# Patient Record
Sex: FEMALE | Race: WHITE | NOT HISPANIC OR LATINO | Employment: FULL TIME | URBAN - METROPOLITAN AREA
[De-identification: names, ages, dates, MRNs, and addresses within clinical notes are randomized per-mention and may not be internally consistent; named-entity substitution may affect disease eponyms.]

---

## 2022-06-06 DIAGNOSIS — Z12.11 SCREENING FOR COLON CANCER: Primary | ICD-10-CM

## 2023-02-02 ENCOUNTER — TELEPHONE (OUTPATIENT)
Dept: GASTROENTEROLOGY | Facility: CLINIC | Age: 53
End: 2023-02-02

## 2023-02-02 ENCOUNTER — OFFICE VISIT (OUTPATIENT)
Dept: GASTROENTEROLOGY | Facility: CLINIC | Age: 53
End: 2023-02-02
Payer: COMMERCIAL

## 2023-02-02 VITALS
DIASTOLIC BLOOD PRESSURE: 85 MMHG | OXYGEN SATURATION: 93 % | BODY MASS INDEX: 35.65 KG/M2 | SYSTOLIC BLOOD PRESSURE: 155 MMHG | HEART RATE: 112 BPM | HEIGHT: 64 IN | WEIGHT: 208.81 LBS

## 2023-02-02 DIAGNOSIS — Z80.0 FAMILY HISTORY OF COLON CANCER: ICD-10-CM

## 2023-02-02 DIAGNOSIS — Z12.11 SCREENING FOR COLON CANCER: ICD-10-CM

## 2023-02-02 DIAGNOSIS — K21.9 GASTROESOPHAGEAL REFLUX DISEASE, UNSPECIFIED WHETHER ESOPHAGITIS PRESENT: Primary | ICD-10-CM

## 2023-02-02 DIAGNOSIS — R13.10 DYSPHAGIA, UNSPECIFIED TYPE: ICD-10-CM

## 2023-02-02 PROCEDURE — 3077F SYST BP >= 140 MM HG: CPT | Mod: CPTII,S$GLB,,

## 2023-02-02 PROCEDURE — 3077F PR MOST RECENT SYSTOLIC BLOOD PRESSURE >= 140 MM HG: ICD-10-PCS | Mod: CPTII,S$GLB,,

## 2023-02-02 PROCEDURE — 99204 PR OFFICE/OUTPT VISIT, NEW, LEVL IV, 45-59 MIN: ICD-10-PCS | Mod: S$GLB,,,

## 2023-02-02 PROCEDURE — 3079F DIAST BP 80-89 MM HG: CPT | Mod: CPTII,S$GLB,,

## 2023-02-02 PROCEDURE — 3008F PR BODY MASS INDEX (BMI) DOCUMENTED: ICD-10-PCS | Mod: CPTII,S$GLB,,

## 2023-02-02 PROCEDURE — 1160F PR REVIEW ALL MEDS BY PRESCRIBER/CLIN PHARMACIST DOCUMENTED: ICD-10-PCS | Mod: CPTII,S$GLB,,

## 2023-02-02 PROCEDURE — 3008F BODY MASS INDEX DOCD: CPT | Mod: CPTII,S$GLB,,

## 2023-02-02 PROCEDURE — 99204 OFFICE O/P NEW MOD 45 MIN: CPT | Mod: S$GLB,,,

## 2023-02-02 PROCEDURE — 1160F RVW MEDS BY RX/DR IN RCRD: CPT | Mod: CPTII,S$GLB,,

## 2023-02-02 PROCEDURE — 3079F PR MOST RECENT DIASTOLIC BLOOD PRESSURE 80-89 MM HG: ICD-10-PCS | Mod: CPTII,S$GLB,,

## 2023-02-02 PROCEDURE — 1159F MED LIST DOCD IN RCRD: CPT | Mod: CPTII,S$GLB,,

## 2023-02-02 PROCEDURE — 1159F PR MEDICATION LIST DOCUMENTED IN MEDICAL RECORD: ICD-10-PCS | Mod: CPTII,S$GLB,,

## 2023-02-02 RX ORDER — SOD SULF/POT CHLORIDE/MAG SULF 1.479 G
12 TABLET ORAL DAILY
Qty: 24 TABLET | Refills: 0 | Status: SHIPPED | OUTPATIENT
Start: 2023-02-02

## 2023-02-02 NOTE — PROGRESS NOTES
Clinic Note    Reason for visit:  The primary encounter diagnosis was Gastroesophageal reflux disease, unspecified whether esophagitis present. Diagnoses of Dysphagia, unspecified type, Family history of colon cancer, and Screening for colon cancer were also pertinent to this visit.    PCP: Jeromy Dia       HPI:  This is a 52 y.o. female who is here to establish care. Patient is scheduled for her first colonoscopy with Dr. Lowery on 2/13/2023 and is here to discuss adding EGD to be done same day as colonoscopy. Patient reports reflux that worsened recently. She used to take baking soda as needed. She is taking panto 40 daily now and states it works well but she may wake up in middle of night with heartburn. Reports intermittent dysphagia with foods but not often. Denies NSAID use. No prior EGD/Colonoscopy. Denies blood in stool or abdominal pain. Mother diagnosed with colon cancer at age 67.       Review of Systems   Constitutional:  Positive for chills and fatigue. Negative for diaphoresis, fever and unexpected weight change.   HENT:  Positive for postnasal drip. Negative for mouth sores, nosebleeds, sore throat, trouble swallowing and voice change.    Eyes:  Negative for pain, discharge and eye dryness.   Respiratory:  Negative for apnea, cough, choking, chest tightness, shortness of breath and wheezing.    Cardiovascular:  Negative for chest pain, palpitations, leg swelling and claudication.   Gastrointestinal:  Positive for reflux. Negative for abdominal distention, abdominal pain, anal bleeding, blood in stool, change in bowel habit, constipation, diarrhea, nausea, rectal pain, vomiting, fecal incontinence and change in bowel habit.   Genitourinary:  Negative for bladder incontinence, difficulty urinating, dysuria, flank pain, frequency and hematuria.   Musculoskeletal:  Positive for back pain and joint swelling. Negative for arthralgias and joint deformity.   Integumentary:  Negative for color change, rash  and wound.   Allergic/Immunologic: Negative for environmental allergies and food allergies.   Neurological:  Negative for seizures, facial asymmetry, speech difficulty, weakness, headaches and memory loss.   Hematological:  Negative for adenopathy. Does not bruise/bleed easily.   Psychiatric/Behavioral:  Negative for agitation, behavioral problems, confusion, hallucinations and sleep disturbance.       Past Medical History:   Diagnosis Date    Heartburn     Mixed hyperlipidemia     Obesity, Class II, BMI 35-39.9, isolated (see actual BMI)      Past Surgical History:   Procedure Laterality Date    EYE SURGERY      Pin in left eye    LAPAROSCOPIC TOTAL HYSTERECTOMY       Family History   Problem Relation Age of Onset    Colon cancer Mother 67    Parkinsonism Father     Heart disease Father     Hypertension Father         Pulmonary Hypertension    Diabetes Father     Hyperlipidemia Father     Ulcers Father     Diabetes Maternal Grandmother     Valvular heart disease Maternal Grandmother     Hypertension Paternal Grandmother     Diabetes Paternal Grandmother     Stroke Paternal Grandmother     Heart disease Paternal Grandfather      Social History     Tobacco Use    Smoking status: Every Day     Packs/day: 1.00     Types: Cigarettes    Smokeless tobacco: Never   Substance Use Topics    Alcohol use: Yes     Comment: Occasional    Drug use: Never     Review of patient's allergies indicates:  No Known Allergies   Medication List with Changes/Refills   New Medications    SOD SULF-POT CHLORIDE-MAG SULF (SUTAB) 1.479-0.188- 0.225 GRAM TABLET    Take 12 tablets by mouth once daily. Take according to package instructions with indicated amount of water. No breakfast day before test. May substitute with Suprep, Clenpiq, Plenvu, Moviprep or GoLytely based on Rx plan and patient preference.   Current Medications    PANTOPRAZOLE (PROTONIX) 40 MG TABLET    Take 40 mg by mouth.    SIMVASTATIN (ZOCOR) 10 MG TABLET    Take 10 mg by  "mouth every evening.   Discontinued Medications    TRIAMCINOLONE ACETONIDE 0.1% (KENALOG) 0.1 % OINTMENT    APPLY TO RASH DISCUSSED TWICE DAILY FOR 10 DAYS         Vital Signs:  BP (!) 155/85   Pulse (!) 112   Ht 5' 4" (1.626 m)   Wt 94.7 kg (208 lb 12.8 oz)   SpO2 (!) 93%   BMI 35.84 kg/m²        Physical Exam  Vitals reviewed.   Constitutional:       General: She is awake. She is not in acute distress.     Appearance: Normal appearance. She is well-developed. She is not ill-appearing, toxic-appearing or diaphoretic.   HENT:      Head: Normocephalic and atraumatic.      Nose: Nose normal.      Mouth/Throat:      Mouth: Mucous membranes are moist.      Pharynx: Oropharynx is clear. No oropharyngeal exudate or posterior oropharyngeal erythema.   Eyes:      General: Lids are normal. Gaze aligned appropriately. No scleral icterus.        Right eye: No discharge.         Left eye: No discharge.      Extraocular Movements: Extraocular movements intact.      Conjunctiva/sclera: Conjunctivae normal.   Neck:      Trachea: Trachea normal.   Cardiovascular:      Rate and Rhythm: Normal rate and regular rhythm.      Pulses:           Radial pulses are 2+ on the right side and 2+ on the left side.   Pulmonary:      Effort: Pulmonary effort is normal. No respiratory distress.      Breath sounds: Normal breath sounds. No stridor. No wheezing or rhonchi.   Chest:      Chest wall: No tenderness.   Abdominal:      General: Bowel sounds are normal. There is no distension.      Palpations: Abdomen is soft. There is no fluid wave, hepatomegaly or mass.      Tenderness: There is no abdominal tenderness. There is no guarding or rebound.   Musculoskeletal:         General: No tenderness or deformity.      Cervical back: Full passive range of motion without pain and neck supple. No tenderness.      Right lower leg: No edema.      Left lower leg: No edema.   Lymphadenopathy:      Cervical: No cervical adenopathy.   Skin:     General: " Skin is warm and dry.      Capillary Refill: Capillary refill takes less than 2 seconds.      Coloration: Skin is not cyanotic, jaundiced or pale.      Findings: No rash.   Neurological:      General: No focal deficit present.      Mental Status: She is alert and oriented to person, place, and time.      Cranial Nerves: No facial asymmetry.      Motor: No tremor.   Psychiatric:         Attention and Perception: Attention normal.         Mood and Affect: Mood and affect normal.         Speech: Speech normal.         Behavior: Behavior normal. Behavior is cooperative.          All of the data above and below has been reviewed by myself and any further interpretations will be reflected in the assessment and plan.   The data includes review of external notes, and independent interpretation of lab results, procedures, x-rays, and imaging reports.      Assessment:  Gastroesophageal reflux disease, unspecified whether esophagitis present  -     Ambulatory Referral to External Surgery    Dysphagia, unspecified type    Family history of colon cancer  -     Ambulatory Referral to External Surgery    Screening for colon cancer  -     Ambulatory Referral to External Surgery  -     sod sulf-pot chloride-mag sulf (SUTAB) 1.479-0.188- 0.225 gram tablet; Take 12 tablets by mouth once daily. Take according to package instructions with indicated amount of water. No breakfast day before test. May substitute with Suprep, Clenpiq, Plenvu, Moviprep or GoLytely based on Rx plan and patient preference.  Dispense: 24 tablet; Refill: 0      GERD: controlled on panto 40 daily. No prior EGD. Will rule out EE and H. Pylori with E/G biopsies.      Recommendations:  Plan for upper and lower endoscopies with Dr. Lowery as scheduled on 2/13/2023.     Risks, benefits, and alternatives of medical management, any associated procedures, and/or treatment discussed with the patient. Patient given opportunity to ask questions and voices understanding.  Patient has elected to proceed with the recommended care modalities as discussed.    Follow up if symptoms worsen or fail to improve.    Order summary:  Orders Placed This Encounter   Procedures    Ambulatory Referral to External Surgery        Instructed patient to notify my office if they have not been contacted within two weeks after any procedures, submitting any samples (biopsies, blood, stool, urine, etc.) or after any imaging (X-ray, CT, MRI, etc.).      Rosario Williamson NP    This document may have been created using a voice recognition transcribing system. Incorrect words or phrases may have been missed during proofreading. Please interpret accordingly or contact me for clarification.

## 2023-02-02 NOTE — LETTER
February 2, 2023        Jeromy Dia MD  [address] [place] [address] [place] [address]             [facility]  [address] [place] [address]  Phone: [phone]  Fax: [phone]   Patient: Cari Rider   MR Number: [id number]   YOB: 1970   Date of Visit: 2/2/2023       Dear Dr. Dia:    Thank you for referring Cari Rider to me for evaluation. Attached you will find relevant portions of my assessment and plan of care.    If you have questions, please do not hesitate to call me. I look forward to following Cari Rider along with you.    Sincerely,      Rosario Williamson NP            CC  No Recipients    Enclosure

## 2023-02-02 NOTE — PATIENT INSTRUCTIONS
Plan for upper and lower endoscopies with Dr. Lowery as scheduled on 2/13/2023.   Continue taking pantoprazole 40 mg daily.     Please notify my office if you have not been contacted within two weeks after any procedures, submitting any samples (biopsies, blood, stool, urine, etc.) or after any imaging (X-ray, CT, MRI, etc.).

## 2023-02-02 NOTE — TELEPHONE ENCOUNTER
"[facility]  [address] [place] [address]  Phone: [phone]  Fax: [phone]    History & Physical         Provider: Dr. Johanne Lowery    Patient Name: Cari DORANTES (age):1970  52 y.o.           Gender: female   Phone: [phone]     Referring Physician: Jeromy Dia     Vital Signs:   Height - 5' 4"  Weight - 208 lb  BMI -  35.84    Plan: EGD/Colonoscopy    Encounter Diagnoses   Name Primary?    Gastroesophageal reflux disease, unspecified whether esophagitis present Yes    Family history of colon cancer     Screening for colon cancer            History:      Past Medical History:   Diagnosis Date    Heartburn     Mixed hyperlipidemia     Obesity, Class II, BMI 35-39.9, isolated (see actual BMI)       Past Surgical History:   Procedure Laterality Date    EYE SURGERY      Pin in left eye    LAPAROSCOPIC TOTAL HYSTERECTOMY        Medication List with Changes/Refills   Current Medications    PANTOPRAZOLE (PROTONIX) 40 MG TABLET    Take 40 mg by mouth.    SIMVASTATIN (ZOCOR) 10 MG TABLET    Take 10 mg by mouth every evening.    SOD SULF-POT CHLORIDE-MAG SULF (SUTAB) 1.479-0.188- 0.225 GRAM TABLET    Take 12 tablets by mouth once daily. Take according to package instructions with indicated amount of water. No breakfast day before test. May substitute with Suprep, Clenpiq, Plenvu, Moviprep or GoLytely based on Rx plan and patient preference.      Review of patient's allergies indicates:  No Known Allergies   Family History   Problem Relation Age of Onset    Colon cancer Mother 67    Parkinsonism Father     Heart disease Father     Hypertension Father         Pulmonary Hypertension    Diabetes Father     Hyperlipidemia Father     Ulcers Father     Diabetes Maternal Grandmother     Valvular heart disease Maternal Grandmother     Hypertension Paternal Grandmother     Diabetes Paternal " Grandmother     Stroke Paternal Grandmother     Heart disease Paternal Grandfather       Social History     Tobacco Use    Smoking status: Every Day     Packs/day: 1.00     Types: Cigarettes    Smokeless tobacco: Never   Substance Use Topics    Alcohol use: Yes     Comment: Occasional    Drug use: Never        Physical Examination:     General Appearance:___________________________  HEENT: _____________________________________  Abdomen:____________________________________  Heart:________________________________________  Lungs:_______________________________________  Extremities:___________________________________  Skin:_________________________________________  Endocrine:____________________________________  Genitourinary:_________________________________  Neurological:__________________________________      Patient has been evaluated immediately prior to sedation and is medically cleared for endoscopy with IVCS as an ASA class: ______      Physician Signature: _________________________       Date: ________  Time: ________

## 2023-02-11 ENCOUNTER — NURSE TRIAGE (OUTPATIENT)
Dept: ADMINISTRATIVE | Facility: CLINIC | Age: 53
End: 2023-02-11
Payer: COMMERCIAL

## 2023-02-11 NOTE — TELEPHONE ENCOUNTER
Pt calling to clarify prep instructions for Monday's procedure. Reviewed instructions. Pt stated that she understands instructions after reading them. Encounter routed to provider.   Reason for Disposition   Question about upcoming scheduled test, no triage required and triager able to answer question    Protocols used: Information Only Call - No Triage-A-

## 2023-02-13 ENCOUNTER — TELEPHONE (OUTPATIENT)
Dept: GASTROENTEROLOGY | Facility: CLINIC | Age: 53
End: 2023-02-13

## 2023-02-13 ENCOUNTER — OUTSIDE PLACE OF SERVICE (OUTPATIENT)
Dept: GASTROENTEROLOGY | Facility: CLINIC | Age: 53
End: 2023-02-13

## 2023-02-13 DIAGNOSIS — R10.9 ABDOMINAL PAIN, UNSPECIFIED ABDOMINAL LOCATION: ICD-10-CM

## 2023-02-13 DIAGNOSIS — K21.9 GASTROESOPHAGEAL REFLUX DISEASE, UNSPECIFIED WHETHER ESOPHAGITIS PRESENT: Primary | ICD-10-CM

## 2023-02-13 DIAGNOSIS — R74.8 ELEVATED LIVER ENZYMES: ICD-10-CM

## 2023-02-13 LAB — CRC RECOMMENDATION EXT: NORMAL

## 2023-02-13 PROCEDURE — 43239 PR EGD, FLEX, W/BIOPSY, SGL/MULTI: ICD-10-PCS | Mod: ,,, | Performed by: INTERNAL MEDICINE

## 2023-02-13 PROCEDURE — 45385 COLONOSCOPY W/LESION REMOVAL: CPT | Mod: 33,52,51, | Performed by: INTERNAL MEDICINE

## 2023-02-13 PROCEDURE — 43239 EGD BIOPSY SINGLE/MULTIPLE: CPT | Mod: ,,, | Performed by: INTERNAL MEDICINE

## 2023-02-13 PROCEDURE — 45385 PR COLONOSCOPY,REMV LESN,SNARE: ICD-10-PCS | Mod: 33,52,51, | Performed by: INTERNAL MEDICINE
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
## 2023-02-14 ENCOUNTER — NURSE TRIAGE (OUTPATIENT)
Dept: ADMINISTRATIVE | Facility: CLINIC | Age: 53
End: 2023-02-14
Payer: COMMERCIAL

## 2023-02-14 NOTE — TELEPHONE ENCOUNTER
Patient states she had a colonoscopy on yesterday, 02/13/23, and c/o lower abdominal pain. Patient states pain is 7/10 when resting and states pain is excruciating when she uses the restroom. Patient states pain comparison to childbirth.    Care Advice given per Colonoscopy Symptoms and Questions (Adult) Guideline. Patient advised to Go to ED Now and to have another adult drive to ED. Patient states understanding of care advice.     Reason for Disposition   SEVERE abdomen pain (e.g., excruciating)    Additional Information   Negative: Shock suspected (e.g., cold/pale/clammy skin, too weak to stand, low BP, rapid pulse)   Negative: Difficult to awaken or acting confused (e.g., disoriented, slurred speech)   Negative: Passed out (i.e., lost consciousness, collapsed and was not responding)   Negative: Sounds like a life-threatening emergency to the triager   Negative: Breathing difficulty   Negative: Chest pain   Negative: [1] Abdomen pain is main concern AND [2] started > 3 days (72 hours) after colonoscopy AND [3] Female   Negative: [1] Abdomen pain is main concern AND [2] started > 3 days (72 hours) after colonoscopy AND [3] male   Negative: [1] Vomiting is main concern AND [2] started > 3 days after colonoscopy    Protocols used: Colonoscopy Symptoms and [id number]

## 2023-02-17 ENCOUNTER — TELEPHONE (OUTPATIENT)
Dept: SURGERY | Facility: CLINIC | Age: 53
End: 2023-02-17
Payer: COMMERCIAL

## 2023-02-17 ENCOUNTER — TELEPHONE (OUTPATIENT)
Dept: GASTROENTEROLOGY | Facility: CLINIC | Age: 53
End: 2023-02-17
Payer: COMMERCIAL

## 2023-02-17 DIAGNOSIS — N73.6 PELVIC ADHESIONS: ICD-10-CM

## 2023-02-17 DIAGNOSIS — R10.9 ABDOMINAL PAIN, UNSPECIFIED ABDOMINAL LOCATION: Primary | ICD-10-CM

## 2023-02-17 DIAGNOSIS — R93.3 ABNORMAL FINDING ON GI TRACT IMAGING: ICD-10-CM

## 2023-02-17 NOTE — TELEPHONE ENCOUNTER
2/14/2023 10.9H/14.2/320, CMP wnl, 36/37.  2/13/2023 DBx nl, GBx gitis w/o Hp, GEBx reflux, EBx reflux, 1 TA. Next colonoscopy with adult colonoscope and staring prone.    Notify patient that her blood results overall look well. Her WBC count was just above normal limits. CT of GI tract ordered.  If her abdominal soreness increases back to a severe pain again, then she should present to the ER. On her biopsies: No signs of infection, precancerous cells, BE or Celiac disease on the upper endoscopy biopsies.  Her colon polyp removed was benign. Will discuss repeat colonoscopy in hospital setting with adult colonoscope and starting prone based on CT results.  NBP    NBP

## 2023-02-17 NOTE — TELEPHONE ENCOUNTER
Patient called complaining of pain since her colonoscopy. She has seen her PCP who gave her antibiotics and told her that she may need a CT scan in the ER. Patient states that she is waiting to hear back about Dr. Lowery ordering a CT. Instructed patient that if she goes to the ER and has a CT to let us know and if scheduling calls her, don't schedule if scan has already been done.

## 2023-02-17 NOTE — TELEPHONE ENCOUNTER
----- Message from Katina Amanda sent at 2/17/2023  8:21 AM CST -----  Contact: self  Type:  Needs Medical Advice    Who Called: Cari Rider   Symptoms (please be specific):  navel and lower abdominal area has been hurting since her colonoscopy - it's down to a soreness today, and she has to hold her stomach in order to urinate, it's at the bottom over to the left and she's also checking to see if CT has been set up yet    Pharmacy name and phone #:    [facility] - [place], [place] - [address] [id number] [facility] [address]  Phone: [phone] Fax: [phone]     Would the patient rather a call back or a response via [facility]?  Call back   Best Call Back Number:  [phone]   Additional Information: Was given antibiotics by her primary care physician

## 2023-02-17 NOTE — TELEPHONE ENCOUNTER
----- Message from Marge Aiden sent at 2/17/2023 10:27 AM CST -----  Regarding: CT Scan  Contact: patient  Per phone call with patient, she stated that the nurse is suppose to be calling her back indicating if she needed to do a CT Scan and if one was ordered.  Please return call at [phone] (home).    Thanks,  SJ

## 2023-02-17 NOTE — TELEPHONE ENCOUNTER
I have tried calling pt back several times and the phone number I am calling will not ring. Nothing but silence.

## 2023-02-20 ENCOUNTER — TELEPHONE (OUTPATIENT)
Dept: GASTROENTEROLOGY | Facility: CLINIC | Age: 53
End: 2023-02-20
Payer: COMMERCIAL

## 2023-02-20 NOTE — TELEPHONE ENCOUNTER
----- Message from Marge Wolfe sent at 2/20/2023  8:08 AM CST -----  Regarding: Appointment  Contact: PATIENT  Per phone call with patient she stated that she went to the ER on Friday and was advise to get with GI physician and diagnosis her with divertuosis.   The patient is needed to make an appointment. Please return call at [phone] (home).    HERBIE Grijalva

## 2023-02-20 NOTE — TELEPHONE ENCOUNTER
Pt called to report that she went to [facility] on 2/17/23. She will come by the office to sign KEVYN.

## 2023-02-27 ENCOUNTER — TELEPHONE (OUTPATIENT)
Dept: GASTROENTEROLOGY | Facility: CLINIC | Age: 53
End: 2023-02-27
Payer: COMMERCIAL

## 2023-02-27 NOTE — TELEPHONE ENCOUNTER
I reviewed the patient's CT images.  She does have a long redundant colon.  Difficult to say if there is some colonic wall thickening versus under distention.  Given radiology report I suspect it is under distention.      I recommend proceeding with the CT enterography I ordered give the oral contrast to better evaluate the GI tract.  Need to know if patient feels like she would like to return to work today or tomorrow, or sooner, for me to complete her disability paperwork.  If she is unable to return to work by tomorrow then will add her on to the end of clinic tomorrow afternoon.  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  CINDY

## 2023-02-27 NOTE — TELEPHONE ENCOUNTER
2/17/2023 [facility] ER notes: LLQ/suprapubic pain, CT AP IV wnl. Given Cipro/Flagyl x7 days given clinical presentation. Labs wnl. Old pelvic fractures on CT. Attempting to open images on CD provided.  NBP

## 2023-02-27 NOTE — PROGRESS NOTES
2/17/2023 [facility] ER notes: LLQ/suprapubic pain, CT AP IV wnl. Given Cipro/Flagyl x7 days given clinical presentation. Labs wnl. Old pelvic fractures on CT. Attempting to open images on CD provided.  NBP

## 2023-02-28 NOTE — TELEPHONE ENCOUNTER
I attempted to reach pt again to discuss recommendations and ask questions. No answer. LVM to call our office.

## 2023-02-28 NOTE — TELEPHONE ENCOUNTER
I spoke with pt. She returned to work yesterday, 2/27/2023. 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  Pt informed of need for CT.

## 2023-03-12 ENCOUNTER — TELEPHONE (OUTPATIENT)
Dept: GASTROENTEROLOGY | Facility: CLINIC | Age: 53
End: 2023-03-12
Payer: COMMERCIAL

## 2023-03-12 DIAGNOSIS — N73.6 PELVIC ADHESIONS: ICD-10-CM

## 2023-03-12 DIAGNOSIS — Z80.0 FAMILY HISTORY OF COLON CANCER: ICD-10-CM

## 2023-03-12 DIAGNOSIS — R10.9 ABDOMINAL PAIN, UNSPECIFIED ABDOMINAL LOCATION: Primary | ICD-10-CM

## 2023-03-12 NOTE — TELEPHONE ENCOUNTER
CTE: nl.    Notify patient that her CT of the GI tract looks well. No signs of infection or inflammation.  Okay to set up repeat colonoscopy at [facility] with adult colonoscope and starting prone.  NBP

## 2023-03-14 NOTE — TELEPHONE ENCOUNTER
Pt given CT results and made aware that someone will be reaching out to her to schedule repeat colonoscopy @ [facility].

## 2023-03-15 NOTE — TELEPHONE ENCOUNTER
Called pt and scheduled a repeat colonoscopy with adult colonoscope and starting prone for 7/25/23 @ [facility]. Pt requested that I email her at [email or web address] to send her prep instructions and coupon. [email or web address]

## 2023-03-22 VITALS — WEIGHT: 208 LBS | HEIGHT: 64 IN | BODY MASS INDEX: 35.51 KG/M2

## 2023-03-22 RX ORDER — SOD SULF/POT CHLORIDE/MAG SULF 1.479 G
12 TABLET ORAL DAILY
Qty: 24 TABLET | Refills: 0 | Status: SHIPPED | OUTPATIENT
Start: 2023-03-22

## 2023-03-22 NOTE — TELEPHONE ENCOUNTER
"[facility]  [address] [place] [address]  Phone: [phone]  Fax: [phone]    History & Physical         Provider: Dr. Johanne Lowery    Patient Name: Cari DORANTES (age):1970  52 y.o.           Gender: female   Phone: [phone]     Referring Physician: Jeromy Dia     Vital Signs:   Height - 5' 4"  Weight - 208 lb  BMI -  35.70    Plan: Colonoscopy w/adult colonoscope (starting prone)     Encounter Diagnoses   Name Primary?    Abdominal pain, unspecified abdominal location Yes    Pelvic adhesions     Family history of colon cancer            History:      Past Medical History:   Diagnosis Date    Heartburn     Mixed hyperlipidemia     Obesity, Class II, BMI 35-39.9, isolated (see actual BMI)       Past Surgical History:   Procedure Laterality Date    EYE SURGERY      Pin in left eye    LAPAROSCOPIC TOTAL HYSTERECTOMY        Medication List with Changes/Refills   New Medications    SOD SULF-POT CHLORIDE-MAG SULF (SUTAB) 1.479-0.188- 0.225 GRAM TABLET    Take 12 tablets by mouth once daily. Take according to package instructions with indicated amount of water. No breakfast day before test. May substitute with Suprep, Clenpiq, Plenvu, Moviprep or GoLytely based on Rx plan and patient preference.   Current Medications    PANTOPRAZOLE (PROTONIX) 40 MG TABLET    Take 40 mg by mouth.    SIMVASTATIN (ZOCOR) 10 MG TABLET    Take 10 mg by mouth every evening.    SOD SULF-POT CHLORIDE-MAG SULF (SUTAB) 1.479-0.188- 0.225 GRAM TABLET    Take 12 tablets by mouth once daily. Take according to package instructions with indicated amount of water. No breakfast day before test. May substitute with Suprep, Clenpiq, Plenvu, Moviprep or GoLytely based on Rx plan and patient preference.      Review of patient's allergies indicates:  No Known Allergies   Family History   Problem Relation Age of Onset    " Colon cancer Mother 67    Parkinsonism Father     Heart disease Father     Hypertension Father         Pulmonary Hypertension    Diabetes Father     Hyperlipidemia Father     Ulcers Father     Diabetes Maternal Grandmother     Valvular heart disease Maternal Grandmother     Hypertension Paternal Grandmother     Diabetes Paternal Grandmother     Stroke Paternal Grandmother     Heart disease Paternal Grandfather       Social History     Tobacco Use    Smoking status: Every Day     Packs/day: 1.00     Types: Cigarettes    Smokeless tobacco: Never   Substance Use Topics    Alcohol use: Yes     Comment: Occasional    Drug use: Never        Physical Examination:     General Appearance:___________________________  HEENT: _____________________________________  Abdomen:____________________________________  Heart:________________________________________  Lungs:_______________________________________  Extremities:___________________________________  Skin:_________________________________________  Endocrine:____________________________________  Genitourinary:_________________________________  Neurological:__________________________________      Patient has been evaluated immediately prior to sedation and is medically cleared for endoscopy with IVCS as an ASA class: ______      Physician Signature: _________________________       Date: ________  Time: ________

## 2023-04-24 ENCOUNTER — DOCUMENTATION ONLY (OUTPATIENT)
Dept: GASTROENTEROLOGY | Facility: CLINIC | Age: 53
End: 2023-04-24
Payer: COMMERCIAL

## 2023-07-20 ENCOUNTER — TELEPHONE (OUTPATIENT)
Dept: GASTROENTEROLOGY | Facility: CLINIC | Age: 53
End: 2023-07-20
Payer: COMMERCIAL

## 2023-07-20 VITALS — WEIGHT: 208 LBS | BODY MASS INDEX: 35.51 KG/M2 | HEIGHT: 64 IN

## 2023-07-20 DIAGNOSIS — N73.6 PELVIC ADHESIONS: ICD-10-CM

## 2023-07-20 DIAGNOSIS — Z80.0 FAMILY HISTORY OF COLON CANCER: ICD-10-CM

## 2023-07-20 DIAGNOSIS — R10.9 ABDOMINAL PAIN, UNSPECIFIED ABDOMINAL LOCATION: Primary | ICD-10-CM

## 2023-07-20 NOTE — TELEPHONE ENCOUNTER
"[facility]  [address] [place] [address]  Phone: [phone]  Fax: [phone]    History & Physical         Provider: Dr. Johanne Lowery    Patient Name: Cari DORANTES (age):1970  53 y.o.           Gender: female   Phone: [phone]     Referring Physician: Jeromy Dia     Vital Signs:   Height - 5' 4"  Weight - 208 lb  BMI -  35.70    Plan: Colonoscopy w/ adult colonoscope (starting prone) @ [facility]    Encounter Diagnoses   Name Primary?    Abdominal pain, unspecified abdominal location Yes    Pelvic adhesions     Family history of colon cancer            History:      Past Medical History:   Diagnosis Date    Heartburn     Mixed hyperlipidemia     Obesity, Class II, BMI 35-39.9, isolated (see actual BMI)       Past Surgical History:   Procedure Laterality Date    EYE SURGERY      Pin in left eye    LAPAROSCOPIC TOTAL HYSTERECTOMY        Medication List with Changes/Refills   Current Medications    PANTOPRAZOLE (PROTONIX) 40 MG TABLET    Take 40 mg by mouth.    SIMVASTATIN (ZOCOR) 10 MG TABLET    Take 10 mg by mouth every evening.    SOD SULF-POT CHLORIDE-MAG SULF (SUTAB) 1.479-0.188- 0.225 GRAM TABLET    Take 12 tablets by mouth once daily. Take according to package instructions with indicated amount of water. No breakfast day before test. May substitute with Suprep, Clenpiq, Plenvu, Moviprep or GoLytely based on Rx plan and patient preference.    SOD SULF-POT CHLORIDE-MAG SULF (SUTAB) 1.479-0.188- 0.225 GRAM TABLET    Take 12 tablets by mouth once daily. Take according to package instructions with indicated amount of water. No breakfast day before test. May substitute with Suprep, Clenpiq, Plenvu, Moviprep or GoLytely based on Rx plan and patient preference.      Review of patient's allergies indicates:  No Known Allergies   Family History   Problem Relation Age of Onset    Colon " cancer Mother 67    Parkinsonism Father     Heart disease Father     Hypertension Father         Pulmonary Hypertension    Diabetes Father     Hyperlipidemia Father     Ulcers Father     Diabetes Maternal Grandmother     Valvular heart disease Maternal Grandmother     Hypertension Paternal Grandmother     Diabetes Paternal Grandmother     Stroke Paternal Grandmother     Heart disease Paternal Grandfather       Social History     Tobacco Use    Smoking status: Every Day     Packs/day: 1.00     Types: Cigarettes    Smokeless tobacco: Never   Substance Use Topics    Alcohol use: Yes     Comment: Occasional    Drug use: Never        Physical Examination:     General Appearance:___________________________  HEENT: _____________________________________  Abdomen:____________________________________  Heart:________________________________________  Lungs:_______________________________________  Extremities:___________________________________  Skin:_________________________________________  Endocrine:____________________________________  Genitourinary:_________________________________  Neurological:__________________________________      Patient has been evaluated immediately prior to sedation and is medically cleared for endoscopy with IVCS as an ASA class: ______      Physician Signature: _________________________       Date: ________  Time: ________

## 2023-07-25 ENCOUNTER — OUTSIDE PLACE OF SERVICE (OUTPATIENT)
Dept: GASTROENTEROLOGY | Facility: CLINIC | Age: 53
End: 2023-07-25

## 2023-07-25 LAB — CRC RECOMMENDATION EXT: NORMAL

## 2023-07-25 PROCEDURE — 45378 PR COLONOSCOPY,DIAGNOSTIC: ICD-10-PCS | Mod: ,,, | Performed by: INTERNAL MEDICINE

## 2023-07-25 PROCEDURE — 45378 DIAGNOSTIC COLONOSCOPY: CPT | Mod: ,,, | Performed by: INTERNAL MEDICINE

## 2023-08-14 ENCOUNTER — DOCUMENTATION ONLY (OUTPATIENT)
Dept: GASTROENTEROLOGY | Facility: CLINIC | Age: 53
End: 2023-08-14
Payer: COMMERCIAL